# Patient Record
Sex: FEMALE | URBAN - METROPOLITAN AREA
[De-identification: names, ages, dates, MRNs, and addresses within clinical notes are randomized per-mention and may not be internally consistent; named-entity substitution may affect disease eponyms.]

---

## 2018-12-11 ENCOUNTER — OFFICE VISIT (OUTPATIENT)
Dept: OTOLARYNGOLOGY | Facility: CLINIC | Age: 68
End: 2018-12-11
Payer: MEDICARE

## 2018-12-11 ENCOUNTER — OFFICE VISIT (OUTPATIENT)
Dept: AUDIOLOGY | Facility: CLINIC | Age: 68
End: 2018-12-11
Payer: MEDICARE

## 2018-12-11 VITALS
DIASTOLIC BLOOD PRESSURE: 82 MMHG | WEIGHT: 145 LBS | BODY MASS INDEX: 23.87 KG/M2 | SYSTOLIC BLOOD PRESSURE: 140 MMHG | TEMPERATURE: 98 F | HEIGHT: 65.5 IN

## 2018-12-11 DIAGNOSIS — IMO0001 ASYMMETRICAL HEARING LOSS OF RIGHT EAR: Primary | ICD-10-CM

## 2018-12-11 DIAGNOSIS — H93.13 TINNITUS OF BOTH EARS: Primary | ICD-10-CM

## 2018-12-11 DIAGNOSIS — H93.13 TINNITUS, BILATERAL: ICD-10-CM

## 2018-12-11 PROCEDURE — 92557 COMPREHENSIVE HEARING TEST: CPT | Performed by: AUDIOLOGIST

## 2018-12-11 PROCEDURE — 92567 TYMPANOMETRY: CPT | Performed by: AUDIOLOGIST

## 2018-12-11 PROCEDURE — G0463 HOSPITAL OUTPT CLINIC VISIT: HCPCS | Performed by: OTOLARYNGOLOGY

## 2018-12-11 PROCEDURE — 99203 OFFICE O/P NEW LOW 30 MIN: CPT | Performed by: OTOLARYNGOLOGY

## 2018-12-11 RX ORDER — SERTRALINE HYDROCHLORIDE 100 MG/1
100 TABLET, FILM COATED ORAL
Refills: 6 | COMMUNITY
Start: 2018-11-06

## 2018-12-11 RX ORDER — ALENDRONATE SODIUM 70 MG/1
TABLET ORAL
Refills: 13 | COMMUNITY
Start: 2018-11-01

## 2018-12-11 NOTE — PROGRESS NOTES
SUBJECTIVE:   Letciia Forte is a 74 year old female who presents to clinic today for the following health issues:    PPSV23- DUE     Hypertension Follow-up      Outpatient blood pressures are not being checked.    Low Salt Diet: not monitoring salt      Amount of exercise or physical activity: 2-3 days/week for an average of 30-45 minutes    Problems taking medications regularly: No    Medication side effects: none    Diet: regular (no restrictions)    Problem list and histories reviewed & adjusted, as indicated.  Additional history: as documented    Patient Active Problem List   Diagnosis     Vertigo     Hyperlipidemia LDL goal <130     Essential hypertension, benign     Counseling regarding advanced directives     No past surgical history on file.    Social History   Substance Use Topics     Smoking status: Never Smoker     Smokeless tobacco: Never Used     Alcohol use Yes      Comment: Socially      Family History   Problem Relation Age of Onset     Family history unknown: Yes         Current Outpatient Prescriptions   Medication Sig Dispense Refill     amoxicillin (AMOXIL) 500 MG capsule Take 1 capsule (500 mg) by mouth 3 times daily 30 capsule 0     lisinopril (PRINIVIL/ZESTRIL) 20 MG tablet Take 1 tablet (20 mg) by mouth daily 30 tablet 0     Allergies   Allergen Reactions     Codeine GI Disturbance     Sulfa Drugs Rash     BP Readings from Last 3 Encounters:   03/21/18 110/80   10/24/17 150/80   09/27/16 (!) 148/92    Wt Readings from Last 3 Encounters:   03/21/18 161 lb (73 kg)   10/24/17 161 lb 3.2 oz (73.1 kg)   09/27/16 150 lb 14.4 oz (68.4 kg)         Reviewed and updated as needed this visit by clinical staff       Reviewed and updated as needed this visit by Provider         ROS:  CONSTITUTIONAL: NEGATIVE for fever, chills, change in weight  ENT/MOUTH: NEGATIVE for ear, mouth and throat problems  RESP: NEGATIVE for significant cough or SOB  CV: NEGATIVE for chest pain, palpitations or peripheral  Wai Gary is a 76year old female. Patient presents with:  Ringing In Ear: Bilateral, more in right ear      HISTORY OF PRESENT ILLNESS  She presents with a history of a finding of atypical cellularity on a biopsy of a breast mass.   No malignancy was "edema  GI: NEGATIVE for nausea, abdominal pain, heartburn, or change in bowel habits  : NEGATIVE for frequency, dysuria, or hematuria  MUSCULOSKELETAL: NEGATIVE for significant arthralgias or myalgia  NEURO: NEGATIVE for weakness, dizziness or paresthesias  HEME: NEGATIVE for bleeding problems  PSYCHIATRIC: NEGATIVE for changes in mood or affect    OBJECTIVE:                                                    /86  Pulse 84  Temp 98  F (36.7  C) (Oral)  Resp 14  Ht 5' 5\" (1.651 m)  Wt 161 lb 7.5 oz (73.2 kg)  SpO2 98%  BMI 26.87 kg/m2  Body mass index is 26.87 kg/(m^2).  GENERAL: healthy, alert and no distress  EYES: Eyes grossly normal to inspection, extraocular movements - intact, and PERRL  HENT: ear canals- normal; TMs- normal; Nose- normal; Mouth- no ulcers, no lesions  NECK: no tenderness, no adenopathy, no asymmetry, no masses, no stiffness; thyroid- normal to palpation  RESP: lungs clear to auscultation - no rales, no rhonchi, no wheezes  CV: regular rates and rhythm, normal S1 S2, no S3 or S4 and no murmur, no click or rub -  MS: extremities- no gross deformities noted, no edema  PSYCH: Alert and oriented times 3; speech- coherent , normal rate and volume; able to articulate logical thoughts, able to abstract reason, no tangential thoughts, no hallucinations or delusions, affect- normal         ASSESSMENT/PLAN:                                                      (I10) Essential hypertension, benign  (primary encounter diagnosis)  Comment: Suggest increasing lisinopril to twice daily dosing and follow-up blood pressure check in 8-week  Plan: lisinopril (PRINIVIL/ZESTRIL) 20 MG tablet,         Comprehensive metabolic panel            (E78.5) Hyperlipidemia LDL goal <130  Comment: Labs ordered as fasting patient will schedule a point  Plan: Comprehensive metabolic panel, Lipid Profile            (Z12.11) Screen for colon cancer  Comment: ADVISED COLONOSCOPY FOR ROUTINE PREVENTATIVE CARE.  Plan: " Neuro Negative Tremors. Psych Negative Anxiety and depression. Integumentary Negative Frequent skin infections, pigment change and rash. Hema/Lymph Negative Easy bleeding and easy bruising.            PHYSICAL EXAM    /82 (BP Location: Left ar GASTROENTEROLOGY ADULT REF PROCEDURE ONLY         Lou Huff [phone]; No Provider        Preference            (Z78.0) Asymptomatic postmenopausal status  Comment: Screening for bone density recommended  Plan: DEXA HIP/PELVIS/SPINE - Future            (Z12.31) Visit for screening mammogram  Comment: Routine annual screening for mammography advised  Plan: MA SCREENING DIGITAL BILAT - Future  (s+30)            (Z23) Need for prophylactic vaccination and inoculation against influenza  Comment: Recommended update for flu vaccination  Plan:     (Z23) Need for prophylactic vaccination against Streptococcus pneumoniae (pneumococcus)  Comment: Recommended update for pneumococcal vaccine  Plan: Pneumococcal vaccine 23 valent PPSV23          (Pneumovax) [[id number]], ADMIN MEDICARE:         Pneumococcal Vaccine ()            (Z23) Need for prophylactic vaccination with tetanus-diphtheria (Td)  Comment: Recommended update for tetanus vaccine  Plan: As well as recommended update for shingles vaccination    Please note after an extensive discussion that took at least 15 minutes beyond the clinic appointment the patient is reluctant to do any screening tests for some reason even after nation of benefits.      See Patient Instructions    Timo Ambrocio MD  [facility]    THE MEDICATION LIST HAS BEEN FULLY RECONCILED BY THE [facility] AND THE NURSING STAFF.  25 minutes spent with this patient, face to face, discussing treatment options for listed problems above as well as side effects of appropriate medications.  Counseling time extended beyond 50% of the clinic visit.  Medication dosing, treatment plan and follow-up were discussed. Also reviewed need for primary care testing for patient.          incidental.  She does note that the ringing does not seem to bother her as much after about 6 months. Audiogram does demonstrate a high-frequency hearing loss bilaterally at the high frequencies with the left being slightly better than the right.   I did r

## 2018-12-13 NOTE — PROGRESS NOTES
AUDIOGRAM     Sidney Joe was referred for testing by Margot Saul. 4/12/1950  [id number]    PT was seen due to bilateral tinnitus. Otoscopic Inspection:  Right ear:  No cerumen  Left ear:  No cerumen    Audiometric Test Results:   Audiometric thr